# Patient Record
Sex: FEMALE | Race: WHITE | URBAN - METROPOLITAN AREA
[De-identification: names, ages, dates, MRNs, and addresses within clinical notes are randomized per-mention and may not be internally consistent; named-entity substitution may affect disease eponyms.]

---

## 2023-03-27 PROBLEM — F41.9 ANXIETY: Status: ACTIVE | Noted: 2023-03-27

## 2023-03-27 PROBLEM — J01.10 ACUTE FRONTAL SINUSITIS: Status: ACTIVE | Noted: 2023-03-27

## 2023-03-27 PROBLEM — L70.0 CYSTIC ACNE: Status: ACTIVE | Noted: 2023-03-27

## 2023-03-27 PROBLEM — F60.5 OBSESSIVE COMPULSIVE PERSONALITY DISORDER (MULTI): Status: ACTIVE | Noted: 2023-03-27

## 2023-03-27 PROBLEM — G43.909 MIGRAINE: Status: ACTIVE | Noted: 2023-03-27

## 2023-03-27 PROBLEM — R53.83 FATIGUE: Status: ACTIVE | Noted: 2023-03-27

## 2023-05-23 DIAGNOSIS — L70.0 ACNE VULGARIS: ICD-10-CM

## 2023-05-24 ENCOUNTER — TELEPHONE (OUTPATIENT)
Dept: PRIMARY CARE | Facility: CLINIC | Age: 48
End: 2023-05-24

## 2023-05-24 RX ORDER — AMOXICILLIN 500 MG/1
CAPSULE ORAL
Qty: 20 CAPSULE | OUTPATIENT
Start: 2023-05-24

## 2023-06-21 DIAGNOSIS — G43.919 INTRACTABLE MIGRAINE WITHOUT STATUS MIGRAINOSUS, UNSPECIFIED MIGRAINE TYPE: ICD-10-CM

## 2023-06-22 RX ORDER — TIZANIDINE 4 MG/1
TABLET ORAL
Qty: 180 TABLET | Refills: 0 | OUTPATIENT
Start: 2023-06-22

## 2023-07-16 DIAGNOSIS — G43.919 INTRACTABLE MIGRAINE WITHOUT STATUS MIGRAINOSUS, UNSPECIFIED MIGRAINE TYPE: ICD-10-CM

## 2023-07-17 RX ORDER — TIZANIDINE 4 MG/1
TABLET ORAL
Qty: 180 TABLET | Refills: 0 | OUTPATIENT
Start: 2023-07-17

## 2023-07-23 DIAGNOSIS — F41.9 ANXIETY: Primary | ICD-10-CM

## 2023-07-24 RX ORDER — PROPRANOLOL HYDROCHLORIDE 20 MG/1
20 TABLET ORAL 3 TIMES DAILY
Qty: 90 TABLET | Refills: 0 | Status: SHIPPED | OUTPATIENT
Start: 2023-07-24

## 2023-08-25 ENCOUNTER — LAB (OUTPATIENT)
Dept: LAB | Facility: LAB | Age: 48
End: 2023-08-25
Payer: COMMERCIAL

## 2023-08-25 ENCOUNTER — OFFICE VISIT (OUTPATIENT)
Dept: PRIMARY CARE | Facility: CLINIC | Age: 48
End: 2023-08-25
Payer: COMMERCIAL

## 2023-08-25 VITALS
BODY MASS INDEX: 40.67 KG/M2 | WEIGHT: 252 LBS | HEART RATE: 69 BPM | SYSTOLIC BLOOD PRESSURE: 104 MMHG | DIASTOLIC BLOOD PRESSURE: 71 MMHG | OXYGEN SATURATION: 97 %

## 2023-08-25 DIAGNOSIS — G43.919 INTRACTABLE MIGRAINE WITHOUT STATUS MIGRAINOSUS, UNSPECIFIED MIGRAINE TYPE: ICD-10-CM

## 2023-08-25 DIAGNOSIS — F41.9 ANXIETY: Primary | ICD-10-CM

## 2023-08-25 DIAGNOSIS — F41.9 ANXIETY: ICD-10-CM

## 2023-08-25 DIAGNOSIS — F60.5 OBSESSIVE COMPULSIVE PERSONALITY DISORDER (MULTI): ICD-10-CM

## 2023-08-25 DIAGNOSIS — Z78.9 VEGAN: ICD-10-CM

## 2023-08-25 PROBLEM — J01.10 ACUTE FRONTAL SINUSITIS: Status: RESOLVED | Noted: 2023-03-27 | Resolved: 2023-08-25

## 2023-08-25 PROCEDURE — 82746 ASSAY OF FOLIC ACID SERUM: CPT

## 2023-08-25 PROCEDURE — 82306 VITAMIN D 25 HYDROXY: CPT

## 2023-08-25 PROCEDURE — 99204 OFFICE O/P NEW MOD 45 MIN: CPT | Performed by: FAMILY MEDICINE

## 2023-08-25 PROCEDURE — 1036F TOBACCO NON-USER: CPT | Performed by: FAMILY MEDICINE

## 2023-08-25 PROCEDURE — 82607 VITAMIN B-12: CPT

## 2023-08-25 PROCEDURE — 80053 COMPREHEN METABOLIC PANEL: CPT

## 2023-08-25 PROCEDURE — 80061 LIPID PANEL: CPT

## 2023-08-25 PROCEDURE — 84443 ASSAY THYROID STIM HORMONE: CPT

## 2023-08-25 PROCEDURE — 36415 COLL VENOUS BLD VENIPUNCTURE: CPT

## 2023-08-25 PROCEDURE — 85025 COMPLETE CBC W/AUTO DIFF WBC: CPT

## 2023-08-25 PROCEDURE — 83921 ORGANIC ACID SINGLE QUANT: CPT

## 2023-08-25 RX ORDER — BUPROPION HYDROCHLORIDE 300 MG/1
300 TABLET ORAL
COMMUNITY
Start: 2023-07-24

## 2023-08-25 RX ORDER — TIZANIDINE 4 MG/1
TABLET ORAL
Qty: 180 TABLET | Refills: 0 | Status: SHIPPED | OUTPATIENT
Start: 2023-08-25 | End: 2023-11-18 | Stop reason: SDUPTHER

## 2023-08-25 RX ORDER — ARIPIPRAZOLE 15 MG/1
15 TABLET ORAL DAILY
COMMUNITY

## 2023-08-25 ASSESSMENT — ENCOUNTER SYMPTOMS
CHEST TIGHTNESS: 0
FATIGUE: 0
DIZZINESS: 0
LIGHT-HEADEDNESS: 0
COLOR CHANGE: 0
COUGH: 0
FACIAL ASYMMETRY: 0
HEADACHES: 0
PALPITATIONS: 0
ACTIVITY CHANGE: 0
BACK PAIN: 0
FEVER: 0
CHOKING: 0
SLEEP DISTURBANCE: 0
APPETITE CHANGE: 0
NERVOUS/ANXIOUS: 1
ARTHRALGIAS: 0

## 2023-08-25 NOTE — PROGRESS NOTES
Subjective   Patient ID: Raina Bundy is a 48 y.o. female who presents for Med Refill and Establish Care.    HPI   Reports she is here to reestablish care and would like a refill of her tizanidine.  Review of Systems   Constitutional:  Negative for activity change, appetite change, fatigue and fever.   HENT:  Negative for congestion.    Respiratory:  Negative for cough, choking and chest tightness.    Cardiovascular:  Negative for chest pain, palpitations and leg swelling.   Musculoskeletal:  Negative for arthralgias, back pain and gait problem.   Skin:  Negative for color change and pallor.   Neurological:  Negative for dizziness, facial asymmetry, light-headedness and headaches.   Psychiatric/Behavioral:  Negative for self-injury, sleep disturbance and suicidal ideas. The patient is nervous/anxious.        Objective   /71   Pulse 69   Wt 114 kg (252 lb)   SpO2 97%   BMI 40.67 kg/m²   BSA Body surface area is 2.3 meters squared.      Physical Exam  Constitutional:       General: She is not in acute distress.     Appearance: Normal appearance. She is not toxic-appearing.   HENT:      Head: Normocephalic.      Right Ear: Tympanic membrane, ear canal and external ear normal.      Left Ear: Tympanic membrane, ear canal and external ear normal.   Eyes:      Conjunctiva/sclera: Conjunctivae normal.      Pupils: Pupils are equal, round, and reactive to light.   Cardiovascular:      Rate and Rhythm: Normal rate and regular rhythm.      Pulses: Normal pulses.      Heart sounds: Normal heart sounds.   Pulmonary:      Effort: No respiratory distress.      Breath sounds: No wheezing, rhonchi or rales.   Musculoskeletal:         General: No swelling or tenderness.      Cervical back: No tenderness.   Skin:     Findings: No lesion or rash.   Neurological:      General: No focal deficit present.      Mental Status: She is alert and oriented to person, place, and time. Mental status is at baseline.      Gait: Gait  normal.   Psychiatric:         Mood and Affect: Mood normal.         Behavior: Behavior normal.         Thought Content: Thought content normal.         Judgment: Judgment normal.       No visits with results within 1 Year(s) from this visit.   Latest known visit with results is:   [facility] Encounter on 03/04/2022   Component Date Value Ref Range Status    DRUG SCREEN COMMENT URINE 03/04/2022 SEE BELOW   Final    Comment: Drug screen results are presumptive and should not be used to assess   compliance with prescribed medication. Definitive confirmatory drug testing   has been added to this sample for any positive screen result and will be   reported separately.   .  Toxicology screening results are reported qualitatively. The concentration   must be greater than or equal to the cutoff to be reported as positive. The   concentration at which the screening test can detect an individual drug or   metabolite varies. The absence of expected drug(s) and/or drug metabolite(s)   may indicate non-compliance, inappropriate timing of specimen collection   relative to drug administration, poor drug absorption, diluted/adulterated   urine, or limitations of testing. For medical purposes only; not valid for   forensic use.   .  Interpretive questions should be directed to the laboratory medical   directors.        Creatine, Urine 03/04/2022 166.0  mg/dL Final    Comment: A urine creatinine result >= 20 mg/dL is considered valid without suspicion   of dilution. Samples with results below this range will automatically reflex   to specific gravity testing to verify specimen integrity.       Amphetamine Screen, Urine 03/04/2022 PRESUMPTIVE NEGATIVE  NEGATIVE Final    Comment:  CUTOFF LEVEL:  500 NG/ML   Cross-reactivity has been reported with high concentrations   of the following drugs: buproprion, chloroquine, chlorpromazine,   ephedrine, mephentermine, fenfluramine, phentermine,   phenylpropanolamine, pseudoephedrine, and  propranolol.      Barbiturate Screen, Urine 03/04/2022 PRESUMPTIVE NEGATIVE  NEGATIVE Final     CUTOFF LEVEL: 200 NG/ML    Cannabinoid Screen, Urine 03/04/2022 PRESUMPTIVE NEGATIVE  NEGATIVE Final     CUTOFF LEVEL: 50 NG/ML    Cocaine Screen, Urine 03/04/2022 PRESUMPTIVE NEGATIVE  NEGATIVE Final     CUTOFF LEVEL: 150 NG/ML    PCP Screen, Urine 03/04/2022 PRESUMPTIVE NEGATIVE  NEGATIVE Final    Comment:  CUTOFF LEVEL:  25 NG/ML   Cross-reactivity has been reported with dextromethorphan.      7-Aminoclonazepam 03/04/2022 >1000 (A)  Cutoff <25 ng/mL Final    Comment: Clonazepam metabolite; consistent with use of a drug containing clonazepam,   such as Klonopin.      Alpha-Hydroxyalprazolam 03/04/2022 <25  Cutoff <25 ng/mL Final    Alpha-Hydroxymidazolam 03/04/2022 <25  Cutoff <25 ng/mL Final    Alprazolam 03/04/2022 <25  Cutoff <25 ng/mL Final    Chlordiazepoxide 03/04/2022 <25  Cutoff <25 ng/mL Final    Clonazepam 03/04/2022 <25  Cutoff <25 ng/mL Final    Diazepam 03/04/2022 <25  Cutoff <25 ng/mL Final    Lorazepam 03/04/2022 <25  Cutoff <25 ng/mL Final    Midazolam 03/04/2022 <25  Cutoff <25 ng/mL Final    Nordiazepam 03/04/2022 <25  Cutoff <25 ng/mL Final    Oxazepam 03/04/2022 <25  Cutoff <25 ng/mL Final    Temazepam 03/04/2022 <25  Cutoff <25 ng/mL Final    Comment:  The performance characteristics of the Benzodiazepine Confirmation,   Urine has been validated by the individual  laboratory site   where testing is performed. It has not been cleared or approved   by the FDA. However the FDA has determined that such clearance   or approval is not necessary. Our Laboratory is certified under   the Clinical Laboratory Improvement Amendments of 1988 (CLIA)   as qualified to perform high complexity clinical laboratory testing.      Zolpidem 03/04/2022 <25  Cutoff <25 ng/mL Final    Zolpidem Metabolite (ZCA) 03/04/2022 <25  Cutoff <25 ng/mL Final    Comment:  The performance characteristics of the Zolpidem  Confirmation,   Urine has been validated by the individual  laboratory site   where testing is performed. It has not been cleared or approved   by the FDA. However the FDA has determined that such clearance   or approval is not necessary. Our Laboratory is certified under   the Clinical Laboratory Improvement Amendments of 1988 (CLIA)   as qualified to perform high complexity clinical laboratory testing.      6-Acetylmorphine 03/04/2022 <25  Cutoff <25 ng/mL Final    Codeine 03/04/2022 <50  Cutoff <50 ng/mL Final    Hydrocodone 03/04/2022 <25  Cutoff <25 ng/mL Final    Hydromorphone 03/04/2022 <25  Cutoff <25 ng/mL Final    Morphine Urine 03/04/2022 <50  Cutoff <50 ng/mL Final    Norhydrocodone 03/04/2022 <25  Cutoff <25 ng/mL Final    Noroxycodone 03/04/2022 <25  Cutoff <25 ng/mL Final    Oxycodone 03/04/2022 <25  Cutoff <25 ng/mL Final    Oxymorphone 03/04/2022 <25  Cutoff <25 ng/mL Final    Comment:  The performance characteristics of the Opiate Confirmation,   Urine has been validated by the individual  laboratory site   where testing is performed. It has not been cleared or approved   by the FDA. However the FDA has determined that such clearance   or approval is not necessary. Our Laboratory is certified under   the Clinical Laboratory Improvement Amendments of 1988 (CLIA)   as qualified to perform high complexity clinical laboratory testing.      Tramadol 03/04/2022 <50  Cutoff <50 ng/mL Final    O-Desmethyltramadol 03/04/2022 <50  Cutoff <50 ng/mL Final    Comment:  The performance characteristics of the Tramadol Confirmation, Urine   has been validated by the individual  laboratory site where   testing is performed. It has not been cleared or approved by the   FDA.  However the FDA has determined that such clearance or approval   is not necessary. Our Laboratory is certified under the Clinical   Laboratory Improvement Amendments of 1988 (CLIA) as qualified    to perform high complexity clinical  laboratory testing.      Fentanyl 03/04/2022 <2.5  Cutoff<2.5 ng/mL Final    Norfentanyl 03/04/2022 <2.5  Cutoff<2.5 ng/mL Final    Comment:  The performance characteristics of the Fentanyl Confirmation,   Urine has been validated by the individual  laboratory site   where testing is performed. It has not been cleared or approved   by the FDA. However the FDA has determined that such clearance   or approval is not necessary. Our Laboratory is certified under   the Clinical Laboratory Improvement Amendments of 1988 (CLIA)   as qualified to perform high complexity clinical laboratory testing.      METHADONE CONFIRMATION,URINE 03/04/2022 <25  Cutoff <25 ng/mL Final    EDDP 03/04/2022 <25  Cutoff <25 ng/mL Final    Comment:  The performance characteristics of the Methadone Confirmation,   Urine has been validated by the individual  laboratory site   where testing is performed. It has not been cleared or approved   by the FDA. However the FDA has determined that such clearance   or approval is not necessary. Our Laboratory is certified under   the Clinical Laboratory Improvement Amendments of 1988 (CLIA)   as qualified to perform high complexity clinical laboratory testing.       Current Outpatient Medications on File Prior to Visit   Medication Sig Dispense Refill    ARIPiprazole (Abilify) 15 mg tablet Take 1 tablet (15 mg) by mouth once daily.      buPROPion XL (Wellbutrin XL) 300 mg 24 hr tablet Take 1 tablet (300 mg) by mouth once daily in the morning. Take before meals.      clonazePAM (KlonoPIN) 0.5 mg tablet Take 1 tablet (0.5 mg) by mouth 4 times a day as needed for anxiety.      propranolol (Inderal) 20 mg tablet TAKE 1 TABLET BY MOUTH THREE TIMES A DAY 90 tablet 0    sertraline (Zoloft) 100 mg tablet Take 2 tablets (200 mg) by mouth once daily.      [DISCONTINUED] tiZANidine (Zanaflex) 4 mg tablet TAKE ONE OR TWO PILLS AT BEDTIME AS NEEDED 180 tablet 0    [DISCONTINUED] ARIPiprazole (Abilify) 2 mg tablet  Take 2 tablets (4 mg) by mouth once daily.      [DISCONTINUED] FLUoxetine (PROzac) 40 mg capsule TAKE 2 CAPSULES BY MOUTH EVERY  capsule 0    [DISCONTINUED] sertraline (Zoloft) 50 mg tablet Take 1 tablet (50 mg) by mouth once daily.       No current facility-administered medications on file prior to visit.     No images are attached to the encounter.            Assessment/Plan   Diagnoses and all orders for this visit:  Anxiety  Obsessive compulsive personality disorder (CMS/HCC)  Intractable migraine without status migrainosus, unspecified migraine type  -     tiZANidine (Zanaflex) 4 mg tablet; TAKE ONE OR TWO PILLS AT BEDTIME AS NEEDED

## 2023-08-26 LAB
ALANINE AMINOTRANSFERASE (SGPT) (U/L) IN SER/PLAS: 14 U/L (ref 7–45)
ALBUMIN (G/DL) IN SER/PLAS: 4.2 G/DL (ref 3.4–5)
ALKALINE PHOSPHATASE (U/L) IN SER/PLAS: 88 U/L (ref 33–110)
ANION GAP IN SER/PLAS: 15 MMOL/L (ref 10–20)
ASPARTATE AMINOTRANSFERASE (SGOT) (U/L) IN SER/PLAS: 15 U/L (ref 9–39)
BASOPHILS (10*3/UL) IN BLOOD BY AUTOMATED COUNT: 0.14 X10E9/L (ref 0–0.1)
BASOPHILS/100 LEUKOCYTES IN BLOOD BY AUTOMATED COUNT: 1.6 % (ref 0–2)
BILIRUBIN TOTAL (MG/DL) IN SER/PLAS: 0.4 MG/DL (ref 0–1.2)
CALCIDIOL (25 OH VITAMIN D3) (NG/ML) IN SER/PLAS: 25 NG/ML
CALCIUM (MG/DL) IN SER/PLAS: 9.9 MG/DL (ref 8.6–10.6)
CARBON DIOXIDE, TOTAL (MMOL/L) IN SER/PLAS: 25 MMOL/L (ref 21–32)
CHLORIDE (MMOL/L) IN SER/PLAS: 105 MMOL/L (ref 98–107)
CHOLESTEROL (MG/DL) IN SER/PLAS: 210 MG/DL (ref 0–199)
CHOLESTEROL IN HDL (MG/DL) IN SER/PLAS: 50 MG/DL
CHOLESTEROL/HDL RATIO: 4.2
COBALAMIN (VITAMIN B12) (PG/ML) IN SER/PLAS: 463 PG/ML (ref 211–911)
CREATININE (MG/DL) IN SER/PLAS: 0.79 MG/DL (ref 0.5–1.05)
EOSINOPHILS (10*3/UL) IN BLOOD BY AUTOMATED COUNT: 0.29 X10E9/L (ref 0–0.7)
EOSINOPHILS/100 LEUKOCYTES IN BLOOD BY AUTOMATED COUNT: 3.3 % (ref 0–6)
ERYTHROCYTE DISTRIBUTION WIDTH (RATIO) BY AUTOMATED COUNT: 12.9 % (ref 11.5–14.5)
ERYTHROCYTE MEAN CORPUSCULAR HEMOGLOBIN CONCENTRATION (G/DL) BY AUTOMATED: 30.6 G/DL (ref 32–36)
ERYTHROCYTE MEAN CORPUSCULAR VOLUME (FL) BY AUTOMATED COUNT: 95 FL (ref 80–100)
ERYTHROCYTES (10*6/UL) IN BLOOD BY AUTOMATED COUNT: 4.27 X10E12/L (ref 4–5.2)
FOLATE (NG/ML) IN SER/PLAS: 13.2 NG/ML
GFR FEMALE: >90 ML/MIN/1.73M2
GLUCOSE (MG/DL) IN SER/PLAS: 97 MG/DL (ref 74–99)
HEMATOCRIT (%) IN BLOOD BY AUTOMATED COUNT: 40.5 % (ref 36–46)
HEMOGLOBIN (G/DL) IN BLOOD: 12.4 G/DL (ref 12–16)
IMMATURE GRANULOCYTES/100 LEUKOCYTES IN BLOOD BY AUTOMATED COUNT: 0.5 % (ref 0–0.9)
LDL: 108 MG/DL (ref 0–99)
LEUKOCYTES (10*3/UL) IN BLOOD BY AUTOMATED COUNT: 8.8 X10E9/L (ref 4.4–11.3)
LYMPHOCYTES (10*3/UL) IN BLOOD BY AUTOMATED COUNT: 2.04 X10E9/L (ref 1.2–4.8)
LYMPHOCYTES/100 LEUKOCYTES IN BLOOD BY AUTOMATED COUNT: 23.2 % (ref 13–44)
MONOCYTES (10*3/UL) IN BLOOD BY AUTOMATED COUNT: 0.36 X10E9/L (ref 0.1–1)
MONOCYTES/100 LEUKOCYTES IN BLOOD BY AUTOMATED COUNT: 4.1 % (ref 2–10)
NEUTROPHILS (10*3/UL) IN BLOOD BY AUTOMATED COUNT: 5.94 X10E9/L (ref 1.2–7.7)
NEUTROPHILS/100 LEUKOCYTES IN BLOOD BY AUTOMATED COUNT: 67.3 % (ref 40–80)
NON HDL CHOLESTEROL: 160 MG/DL
NRBC (PER 100 WBCS) BY AUTOMATED COUNT: 0 /100 WBC (ref 0–0)
PLATELETS (10*3/UL) IN BLOOD AUTOMATED COUNT: 318 X10E9/L (ref 150–450)
POTASSIUM (MMOL/L) IN SER/PLAS: 4.5 MMOL/L (ref 3.5–5.3)
PROTEIN TOTAL: 7 G/DL (ref 6.4–8.2)
SODIUM (MMOL/L) IN SER/PLAS: 140 MMOL/L (ref 136–145)
THYROTROPIN (MIU/L) IN SER/PLAS BY DETECTION LIMIT <= 0.05 MIU/L: 1.81 MIU/L (ref 0.44–3.98)
TRIGLYCERIDE (MG/DL) IN SER/PLAS: 259 MG/DL (ref 0–149)
UREA NITROGEN (MG/DL) IN SER/PLAS: 13 MG/DL (ref 6–23)
VLDL: 52 MG/DL (ref 0–40)

## 2023-08-30 LAB — METHYLMALONIC ACID, S: 0.15 UMOL/L (ref 0–0.4)

## 2023-11-17 DIAGNOSIS — G43.919 INTRACTABLE MIGRAINE WITHOUT STATUS MIGRAINOSUS, UNSPECIFIED MIGRAINE TYPE: ICD-10-CM

## 2023-11-18 RX ORDER — TIZANIDINE 4 MG/1
TABLET ORAL
Qty: 180 TABLET | Refills: 0 | Status: SHIPPED | OUTPATIENT
Start: 2023-11-18 | End: 2024-02-09

## 2024-02-08 DIAGNOSIS — G43.919 INTRACTABLE MIGRAINE WITHOUT STATUS MIGRAINOSUS, UNSPECIFIED MIGRAINE TYPE: ICD-10-CM

## 2024-02-09 RX ORDER — TIZANIDINE 4 MG/1
TABLET ORAL
Qty: 180 TABLET | Refills: 0 | Status: SHIPPED | OUTPATIENT
Start: 2024-02-09 | End: 2024-04-30 | Stop reason: SDUPTHER

## 2024-04-30 ENCOUNTER — PATIENT MESSAGE (OUTPATIENT)
Dept: PRIMARY CARE | Facility: CLINIC | Age: 49
End: 2024-04-30
Payer: COMMERCIAL

## 2024-04-30 DIAGNOSIS — G43.919 INTRACTABLE MIGRAINE WITHOUT STATUS MIGRAINOSUS, UNSPECIFIED MIGRAINE TYPE: ICD-10-CM

## 2024-04-30 RX ORDER — TIZANIDINE 4 MG/1
TABLET ORAL
Qty: 180 TABLET | Refills: 0 | Status: SHIPPED | OUTPATIENT
Start: 2024-04-30 | End: 2024-05-06

## 2024-05-05 DIAGNOSIS — G43.919 INTRACTABLE MIGRAINE WITHOUT STATUS MIGRAINOSUS, UNSPECIFIED MIGRAINE TYPE: ICD-10-CM

## 2024-05-06 DIAGNOSIS — G43.919 INTRACTABLE MIGRAINE WITHOUT STATUS MIGRAINOSUS, UNSPECIFIED MIGRAINE TYPE: ICD-10-CM

## 2024-05-06 RX ORDER — TIZANIDINE 4 MG/1
TABLET ORAL
Qty: 180 TABLET | Refills: 0 | Status: SHIPPED | OUTPATIENT
Start: 2024-05-06 | End: 2024-05-06 | Stop reason: SDUPTHER

## 2024-05-06 RX ORDER — TIZANIDINE 4 MG/1
TABLET ORAL
Qty: 180 TABLET | Refills: 0 | Status: SHIPPED | OUTPATIENT
Start: 2024-05-06

## 2024-07-11 DIAGNOSIS — G43.919 INTRACTABLE MIGRAINE WITHOUT STATUS MIGRAINOSUS, UNSPECIFIED MIGRAINE TYPE: ICD-10-CM

## 2024-07-11 RX ORDER — TIZANIDINE 4 MG/1
TABLET ORAL
Qty: 180 TABLET | Refills: 0 | Status: SHIPPED | OUTPATIENT
Start: 2024-07-11

## 2024-10-07 DIAGNOSIS — G43.919 INTRACTABLE MIGRAINE WITHOUT STATUS MIGRAINOSUS, UNSPECIFIED MIGRAINE TYPE: ICD-10-CM

## 2024-10-07 RX ORDER — TIZANIDINE 4 MG/1
TABLET ORAL
Qty: 180 TABLET | Refills: 0 | Status: SHIPPED | OUTPATIENT
Start: 2024-10-07

## 2024-12-31 DIAGNOSIS — G43.919 INTRACTABLE MIGRAINE WITHOUT STATUS MIGRAINOSUS, UNSPECIFIED MIGRAINE TYPE: ICD-10-CM

## 2024-12-31 RX ORDER — TIZANIDINE 4 MG/1
TABLET ORAL
Qty: 180 TABLET | Refills: 0 | Status: SHIPPED | OUTPATIENT
Start: 2024-12-31

## 2025-04-07 DIAGNOSIS — G43.919 INTRACTABLE MIGRAINE WITHOUT STATUS MIGRAINOSUS, UNSPECIFIED MIGRAINE TYPE: ICD-10-CM

## 2025-04-07 RX ORDER — TIZANIDINE 4 MG/1
TABLET ORAL
Qty: 180 TABLET | Refills: 0 | Status: SHIPPED | OUTPATIENT
Start: 2025-04-07

## 2025-07-09 DIAGNOSIS — G43.919 INTRACTABLE MIGRAINE WITHOUT STATUS MIGRAINOSUS, UNSPECIFIED MIGRAINE TYPE: ICD-10-CM

## 2025-07-09 RX ORDER — TIZANIDINE 4 MG/1
TABLET ORAL
Qty: 180 TABLET | Refills: 0 | Status: SHIPPED | OUTPATIENT
Start: 2025-07-09